# Patient Record
Sex: MALE | Race: WHITE | HISPANIC OR LATINO | Employment: UNEMPLOYED | URBAN - METROPOLITAN AREA
[De-identification: names, ages, dates, MRNs, and addresses within clinical notes are randomized per-mention and may not be internally consistent; named-entity substitution may affect disease eponyms.]

---

## 2022-05-13 ENCOUNTER — APPOINTMENT (OUTPATIENT)
Dept: RADIOLOGY | Facility: MEDICAL CENTER | Age: 67
DRG: 193 | End: 2022-05-13
Attending: EMERGENCY MEDICINE
Payer: MEDICARE

## 2022-05-13 ENCOUNTER — HOSPITAL ENCOUNTER (INPATIENT)
Facility: MEDICAL CENTER | Age: 67
LOS: 1 days | DRG: 193 | End: 2022-05-15
Attending: EMERGENCY MEDICINE | Admitting: STUDENT IN AN ORGANIZED HEALTH CARE EDUCATION/TRAINING PROGRAM
Payer: MEDICARE

## 2022-05-13 DIAGNOSIS — J10.1 INFLUENZA A: ICD-10-CM

## 2022-05-13 DIAGNOSIS — R79.89 ELEVATED TROPONIN: ICD-10-CM

## 2022-05-13 LAB
ALBUMIN SERPL BCP-MCNC: 4.4 G/DL (ref 3.2–4.9)
ALBUMIN/GLOB SERPL: 1.1 G/DL
ALP SERPL-CCNC: 124 U/L (ref 30–99)
ALT SERPL-CCNC: 16 U/L (ref 2–50)
ANION GAP SERPL CALC-SCNC: 13 MMOL/L (ref 7–16)
APPEARANCE UR: CLEAR
AST SERPL-CCNC: 24 U/L (ref 12–45)
BACTERIA #/AREA URNS HPF: NEGATIVE /HPF
BASOPHILS # BLD AUTO: 0.4 % (ref 0–1.8)
BASOPHILS # BLD: 0.03 K/UL (ref 0–0.12)
BILIRUB SERPL-MCNC: 0.7 MG/DL (ref 0.1–1.5)
BILIRUB UR QL STRIP.AUTO: NEGATIVE
BUN SERPL-MCNC: 17 MG/DL (ref 8–22)
CALCIUM SERPL-MCNC: 9.3 MG/DL (ref 8.5–10.5)
CHLORIDE SERPL-SCNC: 98 MMOL/L (ref 96–112)
CO2 SERPL-SCNC: 20 MMOL/L (ref 20–33)
COLOR UR: YELLOW
CREAT SERPL-MCNC: 1.06 MG/DL (ref 0.5–1.4)
EOSINOPHIL # BLD AUTO: 0.04 K/UL (ref 0–0.51)
EOSINOPHIL NFR BLD: 0.5 % (ref 0–6.9)
EPI CELLS #/AREA URNS HPF: NEGATIVE /HPF
ERYTHROCYTE [DISTWIDTH] IN BLOOD BY AUTOMATED COUNT: 43.8 FL (ref 35.9–50)
GFR SERPLBLD CREATININE-BSD FMLA CKD-EPI: 77 ML/MIN/1.73 M 2
GLOBULIN SER CALC-MCNC: 3.9 G/DL (ref 1.9–3.5)
GLUCOSE SERPL-MCNC: 133 MG/DL (ref 65–99)
GLUCOSE UR STRIP.AUTO-MCNC: NEGATIVE MG/DL
HCT VFR BLD AUTO: 44.1 % (ref 42–52)
HGB BLD-MCNC: 15.1 G/DL (ref 14–18)
HYALINE CASTS #/AREA URNS LPF: ABNORMAL /LPF
IMM GRANULOCYTES # BLD AUTO: 0.03 K/UL (ref 0–0.11)
IMM GRANULOCYTES NFR BLD AUTO: 0.4 % (ref 0–0.9)
KETONES UR STRIP.AUTO-MCNC: NEGATIVE MG/DL
LACTATE BLD-SCNC: 1.9 MMOL/L (ref 0.5–2)
LEUKOCYTE ESTERASE UR QL STRIP.AUTO: NEGATIVE
LIPASE SERPL-CCNC: 28 U/L (ref 11–82)
LYMPHOCYTES # BLD AUTO: 1.06 K/UL (ref 1–4.8)
LYMPHOCYTES NFR BLD: 13.8 % (ref 22–41)
MAGNESIUM SERPL-MCNC: 1.4 MG/DL (ref 1.5–2.5)
MCH RBC QN AUTO: 30.3 PG (ref 27–33)
MCHC RBC AUTO-ENTMCNC: 34.2 G/DL (ref 33.7–35.3)
MCV RBC AUTO: 88.6 FL (ref 81.4–97.8)
MICRO URNS: ABNORMAL
MONOCYTES # BLD AUTO: 0.83 K/UL (ref 0–0.85)
MONOCYTES NFR BLD AUTO: 10.8 % (ref 0–13.4)
NEUTROPHILS # BLD AUTO: 5.7 K/UL (ref 1.82–7.42)
NEUTROPHILS NFR BLD: 74.1 % (ref 44–72)
NITRITE UR QL STRIP.AUTO: NEGATIVE
NRBC # BLD AUTO: 0 K/UL
NRBC BLD-RTO: 0 /100 WBC
PH UR STRIP.AUTO: 5 [PH] (ref 5–8)
PHOSPHATE SERPL-MCNC: 3.2 MG/DL (ref 2.5–4.5)
PLATELET # BLD AUTO: 243 K/UL (ref 164–446)
PMV BLD AUTO: 10.2 FL (ref 9–12.9)
POTASSIUM SERPL-SCNC: 4.9 MMOL/L (ref 3.6–5.5)
PROT SERPL-MCNC: 8.3 G/DL (ref 6–8.2)
PROT UR QL STRIP: 30 MG/DL
RBC # BLD AUTO: 4.98 M/UL (ref 4.7–6.1)
RBC # URNS HPF: ABNORMAL /HPF
RBC UR QL AUTO: NEGATIVE
SODIUM SERPL-SCNC: 131 MMOL/L (ref 135–145)
SP GR UR STRIP.AUTO: 1.02
TROPONIN T SERPL-MCNC: 22 NG/L (ref 6–19)
UROBILINOGEN UR STRIP.AUTO-MCNC: 0.2 MG/DL
WBC # BLD AUTO: 7.7 K/UL (ref 4.8–10.8)
WBC #/AREA URNS HPF: ABNORMAL /HPF

## 2022-05-13 PROCEDURE — 96365 THER/PROPH/DIAG IV INF INIT: CPT

## 2022-05-13 PROCEDURE — 83930 ASSAY OF BLOOD OSMOLALITY: CPT

## 2022-05-13 PROCEDURE — 0241U HCHG SARS-COV-2 COVID-19 NFCT DS RESP RNA 4 TRGT MIC: CPT

## 2022-05-13 PROCEDURE — 99285 EMERGENCY DEPT VISIT HI MDM: CPT

## 2022-05-13 PROCEDURE — 84484 ASSAY OF TROPONIN QUANT: CPT

## 2022-05-13 PROCEDURE — 700111 HCHG RX REV CODE 636 W/ 250 OVERRIDE (IP): Performed by: EMERGENCY MEDICINE

## 2022-05-13 PROCEDURE — 80053 COMPREHEN METABOLIC PANEL: CPT

## 2022-05-13 PROCEDURE — 82010 KETONE BODYS QUAN: CPT

## 2022-05-13 PROCEDURE — 87086 URINE CULTURE/COLONY COUNT: CPT

## 2022-05-13 PROCEDURE — 36415 COLL VENOUS BLD VENIPUNCTURE: CPT

## 2022-05-13 PROCEDURE — 85025 COMPLETE CBC W/AUTO DIFF WBC: CPT

## 2022-05-13 PROCEDURE — 84100 ASSAY OF PHOSPHORUS: CPT

## 2022-05-13 PROCEDURE — C9803 HOPD COVID-19 SPEC COLLECT: HCPCS | Performed by: EMERGENCY MEDICINE

## 2022-05-13 PROCEDURE — 87040 BLOOD CULTURE FOR BACTERIA: CPT

## 2022-05-13 PROCEDURE — 83605 ASSAY OF LACTIC ACID: CPT

## 2022-05-13 PROCEDURE — A9270 NON-COVERED ITEM OR SERVICE: HCPCS | Performed by: EMERGENCY MEDICINE

## 2022-05-13 PROCEDURE — 700102 HCHG RX REV CODE 250 W/ 637 OVERRIDE(OP): Performed by: EMERGENCY MEDICINE

## 2022-05-13 PROCEDURE — 83036 HEMOGLOBIN GLYCOSYLATED A1C: CPT

## 2022-05-13 PROCEDURE — 71045 X-RAY EXAM CHEST 1 VIEW: CPT

## 2022-05-13 PROCEDURE — 83690 ASSAY OF LIPASE: CPT

## 2022-05-13 PROCEDURE — 700105 HCHG RX REV CODE 258: Performed by: EMERGENCY MEDICINE

## 2022-05-13 PROCEDURE — 83735 ASSAY OF MAGNESIUM: CPT

## 2022-05-13 PROCEDURE — 81001 URINALYSIS AUTO W/SCOPE: CPT

## 2022-05-13 RX ORDER — ACETAMINOPHEN 325 MG/1
650 TABLET ORAL ONCE
Status: DISCONTINUED | OUTPATIENT
Start: 2022-05-13 | End: 2022-05-13

## 2022-05-13 RX ORDER — ACETAMINOPHEN 325 MG/1
650 TABLET ORAL ONCE
Status: COMPLETED | OUTPATIENT
Start: 2022-05-13 | End: 2022-05-13

## 2022-05-13 RX ORDER — AZITHROMYCIN 500 MG/1
500 INJECTION, POWDER, LYOPHILIZED, FOR SOLUTION INTRAVENOUS ONCE
Status: COMPLETED | OUTPATIENT
Start: 2022-05-13 | End: 2022-05-14

## 2022-05-13 RX ADMIN — ACETAMINOPHEN 650 MG: 325 TABLET, FILM COATED ORAL at 22:50

## 2022-05-13 RX ADMIN — SODIUM CHLORIDE 3 G: 900 INJECTION INTRAVENOUS at 23:40

## 2022-05-14 PROBLEM — A41.9 SEPSIS WITH ACUTE HYPOXIC RESPIRATORY FAILURE WITHOUT SEPTIC SHOCK (HCC): Status: ACTIVE | Noted: 2022-05-14

## 2022-05-14 PROBLEM — J96.01 SEPSIS WITH ACUTE HYPOXIC RESPIRATORY FAILURE WITHOUT SEPTIC SHOCK (HCC): Status: ACTIVE | Noted: 2022-05-14

## 2022-05-14 PROBLEM — R65.20 SEPSIS WITH ACUTE HYPOXIC RESPIRATORY FAILURE WITHOUT SEPTIC SHOCK (HCC): Status: ACTIVE | Noted: 2022-05-14

## 2022-05-14 PROBLEM — J10.1 INFLUENZA A WITH RESPIRATORY MANIFESTATIONS: Status: ACTIVE | Noted: 2022-05-14

## 2022-05-14 PROBLEM — E83.42 HYPOMAGNESEMIA: Status: ACTIVE | Noted: 2022-05-14

## 2022-05-14 PROBLEM — E66.09 CLASS 1 OBESITY DUE TO EXCESS CALORIES WITHOUT SERIOUS COMORBIDITY WITH BODY MASS INDEX (BMI) OF 32.0 TO 32.9 IN ADULT: Status: ACTIVE | Noted: 2022-05-14

## 2022-05-14 PROBLEM — I10 HYPERTENSION: Status: ACTIVE | Noted: 2022-05-14

## 2022-05-14 PROBLEM — J96.01 ACUTE RESPIRATORY FAILURE WITH HYPOXIA (HCC): Status: ACTIVE | Noted: 2022-05-14

## 2022-05-14 PROBLEM — E11.9 TYPE 2 DIABETES MELLITUS WITHOUT COMPLICATION, WITHOUT LONG-TERM CURRENT USE OF INSULIN (HCC): Status: ACTIVE | Noted: 2022-05-14

## 2022-05-14 LAB
B-OH-BUTYR SERPL-MCNC: 0.16 MMOL/L (ref 0.02–0.27)
EST. AVERAGE GLUCOSE BLD GHB EST-MCNC: 134 MG/DL
FLUAV RNA SPEC QL NAA+PROBE: POSITIVE
FLUBV RNA SPEC QL NAA+PROBE: NEGATIVE
GLUCOSE BLD STRIP.AUTO-MCNC: 132 MG/DL (ref 65–99)
GLUCOSE BLD STRIP.AUTO-MCNC: 169 MG/DL (ref 65–99)
GLUCOSE BLD STRIP.AUTO-MCNC: 211 MG/DL (ref 65–99)
HBA1C MFR BLD: 6.3 % (ref 4–5.6)
OSMOLALITY SERPL: 282 MOSM/KG H2O (ref 278–298)
PROCALCITONIN SERPL-MCNC: <0.05 NG/ML
RSV RNA SPEC QL NAA+PROBE: NEGATIVE
SARS-COV-2 RNA RESP QL NAA+PROBE: NOTDETECTED
SPECIMEN SOURCE: ABNORMAL
TROPONIN T SERPL-MCNC: 24 NG/L (ref 6–19)

## 2022-05-14 PROCEDURE — 700111 HCHG RX REV CODE 636 W/ 250 OVERRIDE (IP): Performed by: EMERGENCY MEDICINE

## 2022-05-14 PROCEDURE — 700102 HCHG RX REV CODE 250 W/ 637 OVERRIDE(OP): Performed by: STUDENT IN AN ORGANIZED HEALTH CARE EDUCATION/TRAINING PROGRAM

## 2022-05-14 PROCEDURE — A9270 NON-COVERED ITEM OR SERVICE: HCPCS | Performed by: STUDENT IN AN ORGANIZED HEALTH CARE EDUCATION/TRAINING PROGRAM

## 2022-05-14 PROCEDURE — 700111 HCHG RX REV CODE 636 W/ 250 OVERRIDE (IP): Performed by: STUDENT IN AN ORGANIZED HEALTH CARE EDUCATION/TRAINING PROGRAM

## 2022-05-14 PROCEDURE — 99223 1ST HOSP IP/OBS HIGH 75: CPT | Mod: AI | Performed by: STUDENT IN AN ORGANIZED HEALTH CARE EDUCATION/TRAINING PROGRAM

## 2022-05-14 PROCEDURE — 700105 HCHG RX REV CODE 258: Performed by: STUDENT IN AN ORGANIZED HEALTH CARE EDUCATION/TRAINING PROGRAM

## 2022-05-14 PROCEDURE — 82962 GLUCOSE BLOOD TEST: CPT | Mod: 91

## 2022-05-14 PROCEDURE — 96366 THER/PROPH/DIAG IV INF ADDON: CPT

## 2022-05-14 PROCEDURE — 770001 HCHG ROOM/CARE - MED/SURG/GYN PRIV*

## 2022-05-14 PROCEDURE — 36415 COLL VENOUS BLD VENIPUNCTURE: CPT

## 2022-05-14 PROCEDURE — 700101 HCHG RX REV CODE 250: Performed by: STUDENT IN AN ORGANIZED HEALTH CARE EDUCATION/TRAINING PROGRAM

## 2022-05-14 PROCEDURE — 84484 ASSAY OF TROPONIN QUANT: CPT

## 2022-05-14 PROCEDURE — 84145 PROCALCITONIN (PCT): CPT

## 2022-05-14 PROCEDURE — 96372 THER/PROPH/DIAG INJ SC/IM: CPT

## 2022-05-14 PROCEDURE — 96367 TX/PROPH/DG ADDL SEQ IV INF: CPT

## 2022-05-14 RX ORDER — AMLODIPINE BESYLATE 10 MG/1
5 TABLET ORAL
Status: DISCONTINUED | OUTPATIENT
Start: 2022-05-14 | End: 2022-05-15 | Stop reason: HOSPADM

## 2022-05-14 RX ORDER — HYDRALAZINE HYDROCHLORIDE 20 MG/ML
10 INJECTION INTRAMUSCULAR; INTRAVENOUS EVERY 4 HOURS PRN
Status: DISCONTINUED | OUTPATIENT
Start: 2022-05-14 | End: 2022-05-15 | Stop reason: HOSPADM

## 2022-05-14 RX ORDER — GUAIFENESIN 600 MG/1
600 TABLET, EXTENDED RELEASE ORAL EVERY 12 HOURS
Status: DISCONTINUED | OUTPATIENT
Start: 2022-05-14 | End: 2022-05-15 | Stop reason: HOSPADM

## 2022-05-14 RX ORDER — POLYETHYLENE GLYCOL 3350 17 G/17G
1 POWDER, FOR SOLUTION ORAL
Status: DISCONTINUED | OUTPATIENT
Start: 2022-05-14 | End: 2022-05-15 | Stop reason: HOSPADM

## 2022-05-14 RX ORDER — BISACODYL 10 MG
10 SUPPOSITORY, RECTAL RECTAL
Status: DISCONTINUED | OUTPATIENT
Start: 2022-05-14 | End: 2022-05-15 | Stop reason: HOSPADM

## 2022-05-14 RX ORDER — SODIUM CHLORIDE 9 MG/ML
30 INJECTION, SOLUTION INTRAVENOUS ONCE
Status: COMPLETED | OUTPATIENT
Start: 2022-05-14 | End: 2022-05-14

## 2022-05-14 RX ORDER — AMOXICILLIN 250 MG
2 CAPSULE ORAL 2 TIMES DAILY
Status: DISCONTINUED | OUTPATIENT
Start: 2022-05-14 | End: 2022-05-15 | Stop reason: HOSPADM

## 2022-05-14 RX ORDER — DEXTROSE MONOHYDRATE 25 G/50ML
25 INJECTION, SOLUTION INTRAVENOUS
Status: DISCONTINUED | OUTPATIENT
Start: 2022-05-14 | End: 2022-05-15 | Stop reason: HOSPADM

## 2022-05-14 RX ORDER — AMLODIPINE BESYLATE 5 MG/1
5 TABLET ORAL 2 TIMES DAILY
COMMUNITY

## 2022-05-14 RX ORDER — IPRATROPIUM BROMIDE AND ALBUTEROL SULFATE 2.5; .5 MG/3ML; MG/3ML
3 SOLUTION RESPIRATORY (INHALATION)
Status: DISCONTINUED | OUTPATIENT
Start: 2022-05-14 | End: 2022-05-15 | Stop reason: HOSPADM

## 2022-05-14 RX ORDER — MAGNESIUM SULFATE HEPTAHYDRATE 40 MG/ML
4 INJECTION, SOLUTION INTRAVENOUS ONCE
Status: COMPLETED | OUTPATIENT
Start: 2022-05-14 | End: 2022-05-14

## 2022-05-14 RX ORDER — ACETAMINOPHEN 325 MG/1
650 TABLET ORAL EVERY 6 HOURS PRN
Status: DISCONTINUED | OUTPATIENT
Start: 2022-05-14 | End: 2022-05-15 | Stop reason: HOSPADM

## 2022-05-14 RX ORDER — OSELTAMIVIR PHOSPHATE 75 MG/1
75 CAPSULE ORAL 2 TIMES DAILY
Status: DISCONTINUED | OUTPATIENT
Start: 2022-05-14 | End: 2022-05-15 | Stop reason: HOSPADM

## 2022-05-14 RX ORDER — ENOXAPARIN SODIUM 100 MG/ML
40 INJECTION SUBCUTANEOUS DAILY
Status: DISCONTINUED | OUTPATIENT
Start: 2022-05-14 | End: 2022-05-15 | Stop reason: HOSPADM

## 2022-05-14 RX ADMIN — OSELTAMIVIR PHOSPHATE 75 MG: 75 CAPSULE ORAL at 02:38

## 2022-05-14 RX ADMIN — OSELTAMIVIR PHOSPHATE 75 MG: 75 CAPSULE ORAL at 21:05

## 2022-05-14 RX ADMIN — AZITHROMYCIN MONOHYDRATE 500 MG: 500 INJECTION, POWDER, LYOPHILIZED, FOR SOLUTION INTRAVENOUS at 00:35

## 2022-05-14 RX ADMIN — IPRATROPIUM BROMIDE AND ALBUTEROL SULFATE 3 ML: .5; 2.5 SOLUTION RESPIRATORY (INHALATION) at 18:59

## 2022-05-14 RX ADMIN — AMLODIPINE BESYLATE 5 MG: 10 TABLET ORAL at 12:27

## 2022-05-14 RX ADMIN — SODIUM CHLORIDE 1572 ML: 9 INJECTION, SOLUTION INTRAVENOUS at 02:42

## 2022-05-14 RX ADMIN — GUAIFENESIN 600 MG: 600 TABLET, EXTENDED RELEASE ORAL at 18:00

## 2022-05-14 RX ADMIN — INSULIN GLARGINE-YFGN 10 UNITS: 100 INJECTION, SOLUTION SUBCUTANEOUS at 13:37

## 2022-05-14 RX ADMIN — MAGNESIUM SULFATE HEPTAHYDRATE 4 G: 40 INJECTION, SOLUTION INTRAVENOUS at 02:08

## 2022-05-14 RX ADMIN — ENOXAPARIN SODIUM 40 MG: 40 INJECTION SUBCUTANEOUS at 05:17

## 2022-05-14 ASSESSMENT — COGNITIVE AND FUNCTIONAL STATUS - GENERAL
SUGGESTED CMS G CODE MODIFIER DAILY ACTIVITY: CH
CLIMB 3 TO 5 STEPS WITH RAILING: A LITTLE
MOBILITY SCORE: 20
MOVING FROM LYING ON BACK TO SITTING ON SIDE OF FLAT BED: A LITTLE
DAILY ACTIVITIY SCORE: 24
SUGGESTED CMS G CODE MODIFIER MOBILITY: CJ
WALKING IN HOSPITAL ROOM: A LITTLE
STANDING UP FROM CHAIR USING ARMS: A LITTLE

## 2022-05-14 ASSESSMENT — LIFESTYLE VARIABLES
TOTAL SCORE: 0
TOTAL SCORE: 0
DOES PATIENT WANT TO TALK TO SOMEONE ABOUT QUITTING: NO
EVER HAD A DRINK FIRST THING IN THE MORNING TO STEADY YOUR NERVES TO GET RID OF A HANGOVER: NO
HAVE YOU EVER FELT YOU SHOULD CUT DOWN ON YOUR DRINKING: NO
TOTAL SCORE: 0
HAVE PEOPLE ANNOYED YOU BY CRITICIZING YOUR DRINKING: NO
ALCOHOL_USE: NO
CONSUMPTION TOTAL: INCOMPLETE
EVER FELT BAD OR GUILTY ABOUT YOUR DRINKING: NO
DOES PATIENT WANT TO STOP DRINKING: YES

## 2022-05-14 ASSESSMENT — ENCOUNTER SYMPTOMS
SHORTNESS OF BREATH: 1
DIZZINESS: 0
ABDOMINAL PAIN: 0
DIARRHEA: 0
FEVER: 0
COUGH: 1
NAUSEA: 0
SORE THROAT: 0
HEADACHES: 0
CHILLS: 1
VOMITING: 0

## 2022-05-14 ASSESSMENT — PATIENT HEALTH QUESTIONNAIRE - PHQ9
2. FEELING DOWN, DEPRESSED, IRRITABLE, OR HOPELESS: NOT AT ALL
SUM OF ALL RESPONSES TO PHQ9 QUESTIONS 1 AND 2: 0
1. LITTLE INTEREST OR PLEASURE IN DOING THINGS: NOT AT ALL

## 2022-05-14 ASSESSMENT — PAIN DESCRIPTION - PAIN TYPE: TYPE: ACUTE PAIN

## 2022-05-14 NOTE — ASSESSMENT & PLAN NOTE
This is Sepsis Present on admission  SIRS criteria identified on my evaluation include: Fever, with temperature greater than 101 deg F, Tachycardia, with heart rate greater than 90 BPM and Tachypnea, with respirations greater than 20 per minute  Source is pulmonary   Sepsis protocol initiated  Fluid resuscitation ordered per protocol  Crystalloid Fluid Administration: Fluid resuscitation ordered per standard protocol - 30 mL/kg per current or ideal body weight  IV antibiotics as appropriate for source of sepsis  Reassessment: I have reassessed the patient's hemodynamic status    Influenza A positive, started on Tamiflu  He was given antibiotics in the ED prior to respiratory panel returning.  Procalcitonin pending

## 2022-05-14 NOTE — ED PROVIDER NOTES
"ED Provider Note    CHIEF COMPLAINT  Chief Complaint   Patient presents with   • Flu Like Symptoms     Pt reports high temp at home and dry throat when trying to talk. Pt has dry cough and \"haven't felt very good\" x 3 days       HPI  Aureliano Heredia is a 66 y.o. male who presents to the emergency department with three days of cough and fever. States that he feels like there is phlegm that he is unable to bring up. Now having slight chest wall discomfort with coughing episodes only. Denies any abdominal pain. No nausea vomiting or diarrhea. Traveled to [place] at the end of January into February. Has since been back in the [place]. Has had his COVID vaccines series initially and also seasonal influenza vaccine. States that he is scheduled for his next COVID booster next month.    He has noticed that his blood sugars have spent training slightly on the higher side. This morning was 150. Has been compliant with his diabetic and antihypertensive medication.    REVIEW OF SYSTEMS  See HPI for further details. All other systems are negative.     PAST MEDICAL HISTORY   has a past medical history of Diabetes (HCC) and Hypertension.    SOCIAL HISTORY  Social History     Tobacco Use   • Smoking status: Never Smoker   • Smokeless tobacco: Never Used   Substance and Sexual Activity   • Alcohol use: Never   • Drug use: Never   • Sexual activity: Not on file       SURGICAL HISTORY  patient denies any surgical history    CURRENT MEDICATIONS  Home Medications     Reviewed by Ro Holbrook R.N. (Registered Nurse) on 05/13/22 at 2141  Med List Status: Not Addressed   Medication Last Dose Status        Patient Dillon Taking any Medications                       ALLERGIES  No Known Allergies    PHYSICAL EXAM  VITAL SIGNS: /71   Pulse 96   Temp (!) 38.4 °C (101.1 °F) (Temporal)   Resp 16   Ht 1.55 m (5' 1.02\")   Wt 79.2 kg (174 lb 9.7 oz)   SpO2 96%   BMI 32.97 kg/m²  @SILVINO[[id number]::@   Pulse ox interpretation: I interpret " this pulse ox as normal.  Constitutional: Alert in no apparent distress.  HENT: No signs of trauma, Bilateral external ears normal, Nose normal.   Eyes: Pupils are equal and reactive  Neck: Normal range of motion, No tenderness, Supple  Cardiovascular: tachy, Regular rate and rhythm, no murmurs.   Thorax & Lungs: Normal breath sounds, No respiratory distress, No wheezing, No chest tenderness.   Abdomen: Bowel sounds normal, Soft  Skin: Warm, Dry, No erythema, No rash.   Extremities: Intact distal pulses, No edema  Musculoskeletal: Good range of motion in all major joints. No tenderness to palpation or major deformities noted.   Neurologic: Alert , Normal motor function, Normal sensory function, No focal deficits noted.   Psychiatric: Affect normal, Judgment normal, Mood normal.       DIAGNOSTIC STUDIES / PROCEDURES      LABS  Results for orders placed or performed during the hospital encounter of 05/13/22   CBC With Differential   Result Value Ref Range    WBC 7.7 4.8 - 10.8 K/uL    RBC 4.98 4.70 - 6.10 M/uL    Hemoglobin 15.1 14.0 - 18.0 g/dL    Hematocrit 44.1 42.0 - 52.0 %    MCV 88.6 81.4 - 97.8 fL    MCH 30.3 27.0 - 33.0 pg    MCHC 34.2 33.7 - 35.3 g/dL    RDW 43.8 35.9 - 50.0 fL    Platelet Count 243 164 - 446 K/uL    MPV 10.2 9.0 - 12.9 fL    Neutrophils-Polys 74.10 (H) 44.00 - 72.00 %    Lymphocytes 13.80 (L) 22.00 - 41.00 %    Monocytes 10.80 0.00 - 13.40 %    Eosinophils 0.50 0.00 - 6.90 %    Basophils 0.40 0.00 - 1.80 %    Immature Granulocytes 0.40 0.00 - 0.90 %    Nucleated RBC 0.00 /100 WBC    Neutrophils (Absolute) 5.70 1.82 - 7.42 K/uL    Lymphs (Absolute) 1.06 1.00 - 4.80 K/uL    Monos (Absolute) 0.83 0.00 - 0.85 K/uL    Eos (Absolute) 0.04 0.00 - 0.51 K/uL    Baso (Absolute) 0.03 0.00 - 0.12 K/uL    Immature Granulocytes (abs) 0.03 0.00 - 0.11 K/uL    NRBC (Absolute) 0.00 K/uL   Comp Metabolic Panel   Result Value Ref Range    Sodium 131 (L) 135 - 145 mmol/L    Potassium 4.9 3.6 - 5.5 mmol/L     Chloride 98 96 - 112 mmol/L    Co2 20 20 - 33 mmol/L    Anion Gap 13.0 7.0 - 16.0    Glucose 133 (H) 65 - 99 mg/dL    Bun 17 8 - 22 mg/dL    Creatinine 1.06 0.50 - 1.40 mg/dL    Calcium 9.3 8.5 - 10.5 mg/dL    AST(SGOT) 24 12 - 45 U/L    ALT(SGPT) 16 2 - 50 U/L    Alkaline Phosphatase 124 (H) 30 - 99 U/L    Total Bilirubin 0.7 0.1 - 1.5 mg/dL    Albumin 4.4 3.2 - 4.9 g/dL    Total Protein 8.3 (H) 6.0 - 8.2 g/dL    Globulin 3.9 (H) 1.9 - 3.5 g/dL    A-G Ratio 1.1 g/dL   Lipase   Result Value Ref Range    Lipase 28 11 - 82 U/L   Lactic Acid   Result Value Ref Range    Lactic Acid 1.9 0.5 - 2.0 mmol/L   Urinalysis    Specimen: Urine   Result Value Ref Range    Color Yellow     Character Clear     Specific Gravity 1.017 <1.035    Ph 5.0 5.0 - 8.0    Glucose Negative Negative mg/dL    Ketones Negative Negative mg/dL    Protein 30 (A) Negative mg/dL    Bilirubin Negative Negative    Urobilinogen, Urine 0.2 Negative    Nitrite Negative Negative    Leukocyte Esterase Negative Negative    Occult Blood Negative Negative    Micro Urine Req Microscopic    Magnesium   Result Value Ref Range    Magnesium 1.4 (L) 1.5 - 2.5 mg/dL   Phosphorus   Result Value Ref Range    Phosphorus 3.2 2.5 - 4.5 mg/dL   Troponin   Result Value Ref Range    Troponin T 22 (H) 6 - 19 ng/L   BETA-HYDROXYBUTYRIC ACID   Result Value Ref Range    beta-Hydroxybutyric Acid 0.16 0.02 - 0.27 mmol/L   OSMOLALITY SERUM   Result Value Ref Range    Osmolality Serum 282 278 - 298 mOsm/kg H2O   COV-2, FLU A/B, AND RSV BY PCR (2-4 HOURS CEPHEID): Collect NP swab in VTM    Specimen: Nasopharyngeal; Respirate   Result Value Ref Range    Influenza virus A RNA POSITIVE (A) Negative    Influenza virus B, PCR Negative Negative    RSV, PCR Negative Negative    SARS-CoV-2 by PCR NotDetected     SARS-CoV-2 Source NP Swab    ESTIMATED GFR   Result Value Ref Range    GFR (CKD-EPI) 77 >60 mL/min/1.73 m 2   URINE MICROSCOPIC (W/UA)   Result Value Ref Range    WBC 0-2 (A) /hpf     RBC 0-2 (A) /hpf    Bacteria Negative None /hpf    Epithelial Cells Negative /hpf    Hyaline Cast 0-2 /lpf   TROPONIN   Result Value Ref Range    Troponin T 24 (H) 6 - 19 ng/L   HEMOGLOBIN A1C   Result Value Ref Range    Glycohemoglobin 6.3 (H) 4.0 - 5.6 %    Est Avg Glucose 134 mg/dL         RADIOLOGY  DX-CHEST-PORTABLE (1 VIEW)   Final Result      Low lung volumes with perihilar and basilar hypoventilatory changes. No acute cardiopulmonary abnormality.              COURSE & MEDICAL DECISION MAKING  Pertinent Labs & Imaging studies reviewed. (See chart for details)    66-year-old male presented to the emergency department with the above presentation. Influenza A positive which I believe is the primary causation of his current clinical presentation. He does however have a mild elevated troponin. This is most likely secondary to current inflammatory process but this could theoretically also represent a mild myocarditis. EKG as above. I will the patient brought into the hospital for ongoing inpatient care and workup.        FINAL IMPRESSION  1. Influenza A    2. Elevated troponin            Electronically signed by: Sheldon Pena M.D., 5/13/2022 10:49 PM

## 2022-05-14 NOTE — PROGRESS NOTES
0700- 1930    VSS on room air. Denies pain. Tolerating diet without nausea. Adequately voiding with bedside urinal. Up SBA with personal cane. Droplet and contact precautions maintained. Pt sounding wheezy toward end of shift, RT messaged for PRN DUONEB.  utilized. Safety maintained.

## 2022-05-14 NOTE — H&P
"Hospital Medicine History & Physical Note    Date of Service  5/14/2022    Primary Care Physician  No primary care provider on file.    Code Status  Full Code    Chief Complaint  Chief Complaint   Patient presents with   • Flu Like Symptoms     Pt reports high temp at home and dry throat when trying to talk. Pt has dry cough and \"haven't felt very good\" x 3 days       History of Presenting Illness  Aureliano Heredia is a 66 y.o. male who presented 5/13/2022 with flulike symptoms.  PMH of DM2.  For the past 3 days complaining of cough, chills, generalized aches, weakness.  Denies chest pain, did not measure fever at home, no abdominal or urinary symptoms.  No sick contacts, in the ED said he was vaccinated for flu but he told me he was not.    In the ED febrile at 101.1, tachycardic, tachypneic, saturating in the 80s on room air requiring 2 L O2.  Labs show flu positive.    I discussed the plan of care with patient, family, bedside RN and edp.    Review of Systems  Review of Systems   Constitutional: Positive for chills and malaise/fatigue. Negative for fever.   HENT: Negative for sore throat.    Respiratory: Positive for cough and shortness of breath.    Cardiovascular: Negative for chest pain.   Gastrointestinal: Negative for abdominal pain, diarrhea, nausea and vomiting.   Genitourinary: Negative for dysuria and urgency.   Neurological: Negative for dizziness and headaches.   All other systems reviewed and are negative.      Past Medical History   has a past medical history of Diabetes (HCC) and Hypertension.    Surgical History   has no past surgical history on file.     Family History  family history is not on file.   Family history reviewed with patient. There is no family history that is pertinent to the chief complaint.     Social History   reports that he has never smoked. He has never used smokeless tobacco. He reports that he does not drink alcohol and does not use drugs.    Allergies  No Known " Allergies    Medications  None       Physical Exam  Temp:  [38.4 °C (101.1 °F)] 38.4 °C (101.1 °F)  Pulse:  [] 96  Resp:  [16-35] 16  BP: (115-152)/(69-91) 117/71  SpO2:  [89 %-96 %] 96 %  Blood Pressure : 117/71   Temperature: (!) 38.4 °C (101.1 °F)   Pulse: 96   Respiration: 16   Pulse Oximetry: 96 %       Physical Exam  Constitutional:       Appearance: He is obese.   HENT:      Head: Normocephalic and atraumatic.      Mouth/Throat:      Mouth: Mucous membranes are dry.      Pharynx: Oropharynx is clear. No oropharyngeal exudate or posterior oropharyngeal erythema.   Eyes:      General: No scleral icterus.  Cardiovascular:      Rate and Rhythm: Normal rate and regular rhythm.      Pulses: Normal pulses.      Heart sounds: Normal heart sounds. No murmur heard.  Pulmonary:      Effort: Pulmonary effort is normal. No respiratory distress.      Breath sounds: Normal breath sounds. No wheezing.   Abdominal:      Palpations: Abdomen is soft.      Tenderness: There is no abdominal tenderness.   Musculoskeletal:         General: No swelling or tenderness. Normal range of motion.      Cervical back: Normal range of motion.   Skin:     General: Skin is warm and dry.   Neurological:      General: No focal deficit present.      Mental Status: He is alert and oriented to person, place, and time. Mental status is at baseline.   Psychiatric:         Mood and Affect: Mood normal.         Laboratory:  Recent Labs     05/13/22 2227   WBC 7.7   RBC 4.98   HEMOGLOBIN 15.1   HEMATOCRIT 44.1   MCV 88.6   MCH 30.3   MCHC 34.2   RDW 43.8   PLATELETCT 243   MPV 10.2     Recent Labs     05/13/22 2227   SODIUM 131*   POTASSIUM 4.9   CHLORIDE 98   CO2 20   GLUCOSE 133*   BUN 17   CREATININE 1.06   CALCIUM 9.3     Recent Labs     05/13/22 2227   ALTSGPT 16   ASTSGOT 24   ALKPHOSPHAT 124*   TBILIRUBIN 0.7   LIPASE 28   GLUCOSE 133*         No results for input(s): NTPROBNP in the last 72 hours.      Recent Labs     05/13/22 2227  05/14/22  0033   TROPONINT 22* 24*       Imaging:  DX-CHEST-PORTABLE (1 VIEW)   Final Result      Low lung volumes with perihilar and basilar hypoventilatory changes. No acute cardiopulmonary abnormality.          X-Ray:  I have personally reviewed the images and compared with prior images. and My impression is: no acute abnormalities     Assessment/Plan:  Justification for Admission Status  I anticipate this patient will require at least two midnights for appropriate medical management, necessitating inpatient admission because Patient flu positive and 3/4 SIRS criteria including fever, tachycardia, tachypnea.  He was saturating in the 80s on room air requiring oxygen per nasal cannula.  Also has electrolyte abnormalities due to decreased intake due to acute illness.  Required admission and treatment and monitoring for improvement in vitals, oxygen, labs.  This would require more than 2 midnight stay.    * Influenza A with respiratory manifestations- (present on admission)  Assessment & Plan  Positive for influenza A, has cough and respiratory symptoms requiring oxygen  Symptoms for 3 days, since she will be hospitalized due to sepsis and hypoxia will start on Tamiflu    Acute respiratory failure with hypoxia (HCC)- (present on admission)  Assessment & Plan  Saturating in the 80s on room air requiring 1-2 L O2 on my evaluation  Has acute flu infection with respiratory symptoms likely the cause  In addition patient obese, may have component of obesity hypoventilation syndrome and ALL which would require outpatient follow-up  Wean off oxygen as tolerated, incentive spirometer    Sepsis with acute hypoxic respiratory failure without septic shock (HCC)- (present on admission)  Assessment & Plan  This is Sepsis Present on admission  SIRS criteria identified on my evaluation include: Fever, with temperature greater than 101 deg F, Tachycardia, with heart rate greater than 90 BPM and Tachypnea, with respirations greater  than 20 per minute  Source is pulmonary   Sepsis protocol initiated  Fluid resuscitation ordered per protocol  Crystalloid Fluid Administration: Fluid resuscitation ordered per standard protocol - 30 mL/kg per current or ideal body weight  IV antibiotics as appropriate for source of sepsis  Reassessment: I have reassessed the patient's hemodynamic status    Influenza A positive, started on Tamiflu  He was given antibiotics in the ED prior to respiratory panel returning.  Procalcitonin pending    Hypomagnesemia- (present on admission)  Assessment & Plan  Likely due to decreased p.o. intake due to acute illness, replete as needed    Type 2 diabetes mellitus without complication, without long-term current use of insulin (HCC)- (present on admission)  Assessment & Plan  Not on any medication.  Slightly hyperglycemic, check A1c  Start sliding scale if needed    Class 1 obesity due to excess calories without serious comorbidity with body mass index (BMI) of 32.0 to 32.9 in adult- (present on admission)  Assessment & Plan  BMI 32.97      VTE prophylaxis: enoxaparin ppx

## 2022-05-14 NOTE — ED TRIAGE NOTES
"Chief Complaint   Patient presents with   • Flu Like Symptoms     Pt reports high temp at home and dry throat when trying to talk. Pt has dry cough and \"haven't felt very good\" x 3 days       Pt to triage with cane and steady gait for above complaint.     Pt presents in triage with reports of not feeling well x3 days. Pt reports high temp at home and dry cough with difficulties speaking due to dryness in throat.    Pt back to lobby, educated on triage process and encourage to alert staff of any changes.     BP (!) 152/90   Pulse (!) 127   Temp (!) 38.4 °C (101.1 °F) (Temporal)   Resp 16   Ht 1.55 m (5' 1.02\")   Wt 79.2 kg (174 lb 9.7 oz)   SpO2 92%   BMI 32.97 kg/m²     "

## 2022-05-14 NOTE — ASSESSMENT & PLAN NOTE
Saturating in the 80s on room air requiring 1-2 L O2 on my evaluation  Has acute flu infection with respiratory symptoms likely the cause  In addition patient obese, may have component of obesity hypoventilation syndrome and ALL which would require outpatient follow-up  Wean off oxygen as tolerated, incentive spirometer

## 2022-05-14 NOTE — PROGRESS NOTES
Patient was seen and examined at bedside.  Patient is Serbian spoken only.   was used during the interview.  Care plan was discussed with the patient and the wife at bedside.     Patient feels better, reported a cough with thick sputum.  Ordered Mucinex, DuoNeb.  Denies smoke or history of COPD.   Reported history of diabetes a.m. insulin 28 units and p.m.  insulin 15 units.   at admission.  Ordered Lantus 10 units, plus SSI, continue to monitor    History of hypertension, resume home amlodipine    Currently on 2 L oxygen, continue to wean down oxygen.  plan for ambulation test later today.  May DC home tomorrow.

## 2022-05-14 NOTE — ASSESSMENT & PLAN NOTE
Positive for influenza A, has cough and respiratory symptoms requiring oxygen  Symptoms for 3 days, since she will be hospitalized due to sepsis and hypoxia will start on Tamiflu

## 2022-05-14 NOTE — ED NOTES
Assumed report and patient care from Mellisa RN. Patient is resting comfortably in bed with no signs of labored breathing or restlessness. POC discussed. No questions or concerns at this time. Whiteboard updated. Pending azithromycin admin. Safety measures in place, call light within reach.

## 2022-05-14 NOTE — ASSESSMENT & PLAN NOTE
Reported a.m. insulin 28 units and p.m.  insulin 15 units.     at admission.  a1c 6.3  Ordered Lantus 10 units, SSI,   continue to monitor

## 2022-05-14 NOTE — ED NOTES
Report given to Virgilio MORGAN. Safety measures in place. Call light within reach. Care relinquished.

## 2022-05-15 ENCOUNTER — PHARMACY VISIT (OUTPATIENT)
Dept: PHARMACY | Facility: MEDICAL CENTER | Age: 67
End: 2022-05-15
Payer: COMMERCIAL

## 2022-05-15 VITALS
WEIGHT: 174.6 LBS | OXYGEN SATURATION: 92 % | HEIGHT: 61 IN | SYSTOLIC BLOOD PRESSURE: 138 MMHG | RESPIRATION RATE: 16 BRPM | HEART RATE: 88 BPM | BODY MASS INDEX: 32.97 KG/M2 | TEMPERATURE: 98 F | DIASTOLIC BLOOD PRESSURE: 73 MMHG

## 2022-05-15 LAB
ANION GAP SERPL CALC-SCNC: 11 MMOL/L (ref 7–16)
BUN SERPL-MCNC: 15 MG/DL (ref 8–22)
CALCIUM SERPL-MCNC: 8.7 MG/DL (ref 8.5–10.5)
CHLORIDE SERPL-SCNC: 98 MMOL/L (ref 96–112)
CO2 SERPL-SCNC: 23 MMOL/L (ref 20–33)
CREAT SERPL-MCNC: 0.96 MG/DL (ref 0.5–1.4)
ERYTHROCYTE [DISTWIDTH] IN BLOOD BY AUTOMATED COUNT: 43.4 FL (ref 35.9–50)
GFR SERPLBLD CREATININE-BSD FMLA CKD-EPI: 87 ML/MIN/1.73 M 2
GLUCOSE BLD STRIP.AUTO-MCNC: 123 MG/DL (ref 65–99)
GLUCOSE BLD STRIP.AUTO-MCNC: 133 MG/DL (ref 65–99)
GLUCOSE BLD STRIP.AUTO-MCNC: 140 MG/DL (ref 65–99)
GLUCOSE SERPL-MCNC: 121 MG/DL (ref 65–99)
HCT VFR BLD AUTO: 39.3 % (ref 42–52)
HGB BLD-MCNC: 13.2 G/DL (ref 14–18)
MAGNESIUM SERPL-MCNC: 1.7 MG/DL (ref 1.5–2.5)
MCH RBC QN AUTO: 29.7 PG (ref 27–33)
MCHC RBC AUTO-ENTMCNC: 33.6 G/DL (ref 33.7–35.3)
MCV RBC AUTO: 88.3 FL (ref 81.4–97.8)
PLATELET # BLD AUTO: 223 K/UL (ref 164–446)
PMV BLD AUTO: 9.5 FL (ref 9–12.9)
POTASSIUM SERPL-SCNC: 4.6 MMOL/L (ref 3.6–5.5)
RBC # BLD AUTO: 4.45 M/UL (ref 4.7–6.1)
SODIUM SERPL-SCNC: 132 MMOL/L (ref 135–145)
WBC # BLD AUTO: 4.7 K/UL (ref 4.8–10.8)

## 2022-05-15 PROCEDURE — 80048 BASIC METABOLIC PNL TOTAL CA: CPT

## 2022-05-15 PROCEDURE — 83735 ASSAY OF MAGNESIUM: CPT

## 2022-05-15 PROCEDURE — 700102 HCHG RX REV CODE 250 W/ 637 OVERRIDE(OP): Performed by: STUDENT IN AN ORGANIZED HEALTH CARE EDUCATION/TRAINING PROGRAM

## 2022-05-15 PROCEDURE — 85027 COMPLETE CBC AUTOMATED: CPT

## 2022-05-15 PROCEDURE — 82962 GLUCOSE BLOOD TEST: CPT

## 2022-05-15 PROCEDURE — 99239 HOSP IP/OBS DSCHRG MGMT >30: CPT | Performed by: FAMILY MEDICINE

## 2022-05-15 PROCEDURE — A9270 NON-COVERED ITEM OR SERVICE: HCPCS | Performed by: STUDENT IN AN ORGANIZED HEALTH CARE EDUCATION/TRAINING PROGRAM

## 2022-05-15 PROCEDURE — 700111 HCHG RX REV CODE 636 W/ 250 OVERRIDE (IP): Performed by: STUDENT IN AN ORGANIZED HEALTH CARE EDUCATION/TRAINING PROGRAM

## 2022-05-15 PROCEDURE — 36415 COLL VENOUS BLD VENIPUNCTURE: CPT

## 2022-05-15 RX ORDER — OSELTAMIVIR PHOSPHATE 75 MG/1
75 CAPSULE ORAL 2 TIMES DAILY
Qty: 7 CAPSULE | Refills: 0 | Status: SHIPPED | OUTPATIENT
Start: 2022-05-15 | End: 2022-05-21

## 2022-05-15 RX ADMIN — GUAIFENESIN 600 MG: 600 TABLET, EXTENDED RELEASE ORAL at 06:51

## 2022-05-15 RX ADMIN — AMLODIPINE BESYLATE 5 MG: 10 TABLET ORAL at 06:50

## 2022-05-15 RX ADMIN — SENNOSIDES AND DOCUSATE SODIUM 2 TABLET: 50; 8.6 TABLET ORAL at 06:51

## 2022-05-15 RX ADMIN — OSELTAMIVIR PHOSPHATE 75 MG: 75 CAPSULE ORAL at 07:07

## 2022-05-15 RX ADMIN — INSULIN GLARGINE-YFGN 10 UNITS: 100 INJECTION, SOLUTION SUBCUTANEOUS at 07:07

## 2022-05-15 RX ADMIN — ENOXAPARIN SODIUM 40 MG: 40 INJECTION SUBCUTANEOUS at 06:52

## 2022-05-15 NOTE — DISCHARGE INSTRUCTIONS
"Influenza A (H1N1)  (Influenza A [H1N1])  La gripe H1N1, llamada \"gripe porcina\" es producida por un nuevo virus de influenza. El virus H1N1 es diferente del virus de la influenza estacional. Las últimas pruebas muestran que el virus H1N1 tiene genes similares a los del virus de la gripe que afecta a los cerdos en [place] y [place]. Los síntomas son similares a los de la gripe estacional y se disemina de persona a persona. Usted puede tener riesgo de sufrir mayores complicaciones si tiene ella enfermedad crónica subyacente. El CDC y la [employer] [place] están controlando los casos informados en todo el geoff.  CAUSAS  La gripe se contagia a través de las gotitas que se eliminan con la tos y los estornudos.  Ella persona puede infectarse tocando algo que contenga el virus y luego tocándose la boca o la nariz.  SÍNTOMAS  Fiebre.  Dolor de irma.  Cansancio.  Tos.  Dolor de garganta.  Congestión nasal o que gotea.  Jannette en el cuerpo.  También pueden existir diarrea y vómitos.  Estos síntomas se conocen alan \"síntomas característicos de la gripe\". Muchas enfermedades distintas, incluso el resfrío común, pueden tener síntomas similares.  DIAGNÓSTICO  Existen análisis que permiten diagnosticar si usted tiene el virus H1N1.  Todos los casos confirmados serán informados al departamento de eliseo estatal o local.  Puede ser necesario que huff médico le realice pruebas para determinar si tiene otra infección que pueda ser ella complicación de la gripe.  INSTRUCCIONES PARA EL CUIDADO DOMICILIARIO  Busque ayuda de inmediato si desarrolla alguno de los siguientes síntomas.  Si está en riesgo de sufrir complicaciones de la gripe, deberá consultar a un profesional de la eliseo cuando comiencen los síntomas. Las personas que tienen un alto riesgo de complicaciones son:  Mayores de 65 años de edad.  Pacientes con enfermedades crónicas.  Mujeres embarazadas.  Niños pequeños.  El profesional que lo asiste podrá recomendar la " utilización de ciertas drogas antivirales para el tratamiento de la gripe.  Si contrae gripe, es aconsejable que descanse lo suficiente, fatou gran cantidad de líquidos y evite el consumo de alcohol y tabaco.  También puede radha medicamentos de venta norbert que alivien los síntomas de la gripe, si se lo permite el profesional que lo asiste. (Nunca de aspirina a niños o adolescentes que tienen síntomas de la gripe, particularmente fiebre).  TRATAMIENTO  Si contrae la enfermedad, hay drogas antivirales disponibles. Estos medicamentos pueden hacer que huff enfermedad sea más leve y a que mejore más rápidamente. El tratamiento debe comenzar poco después del inicio de la enfermedad. Sólo el médico puede prescribir medicamentos antivirales.  PREVENCIÓN  Cúbrase la boca y la nariz con un tisú o con la mano cuando tosa o estornude. Descarte el tisú.  Lave kalli mariah frecuentemente con agua y jabón. Utilice un limpiador a base de alcohol si no dispone de agua.  Evite tocarse los ojos, la nariz o la boca.  Trate de evitar el contacto con personas enfermas.  Si está enfermo, quédese en huff casa y no concurra al trabajo o a la escuela para evitar el contagio a otras personas. Las personas infectadas con el virus H1N1 pueden infectar a otras desde un día antes de tener los síntomas hasta 5 a 7 días después.  Hay disponible ella vacuna que ayuda a la protección contra el virus H1N1. Además de esta vacuna, deberá vacunarse contra la gripe estacional. Estas dos vacunas deben aplicarse el mismo día. El CDC recomienda especialmente la vacuna contra el virus H1N1 en:  Mujeres embarazadas.  Personas que viven con niños menores de 6 meses o cuidan de ellos.  Personal de los servicios de eliseo y emergencias.  Personas entre los 6 meses y los 24 años de edad.  Personas entre 25 y 64 años que tienen más riesgo si contraen el virus H1N1 por que padecen enfermedades crónicas o porque tienen compromiso de huff sistema inmunológico.  GARRET:  En  comunidades y hogares no se recomienda el uso de mascarillas ni barbijos N95. En ciertas circunstancias, pueden utilizarlos las personas que tienen más riesgo de desarrollar levi graves de influenza. Huff médico le podrá ras otras tras recomendaciones para el uso de las mascarillas.  SIGNOS DE ALERTA POR TRATARSE DE ELLA EMERGENCIA QUE NECESITAN ATENCIÓN MÉDICA DE URGENCIA TANTO EN NIÑOS :  Respiración rápida o problemas para respirar.  Color de piel azulado.  No nilson suficiente cantidad de líquidos.  No se despierta o no interactúa.  Está tan irritable que no quiere que se lo cargue.  Huff michelle tienen ella temperatura oral de más de 102° F (38.9° C) y no puede controlarla con medicamentos.  Huff bebé tiene más de 3 meses y huff temperatura rectal es de 102° F (38.9° C) o más.  Huff bebé tiene 3 meses o menos y huff temperatura rectal es de 100.4° F (38° C) o más.  Fiebre con erupción cutánea.  SIGNOS DE ALERTA POR TRATARSE DE ELLA EMERGENCIA QUE NECESITAN ATENCIÓN MÉDICA DE URGENCIA TANTO EN ADULTOS:  Le falta la respiración o presenta dificultades respiratorias.  Dolor o presión en el pecho o abdomen.  Mareos repentinos.  Confusión.  Vómitos intensos y persistentes.  Está tan irritable que no quiere que se lo cargue.  Usted ella temperatura oral de más de 102° F (38.9° C) y no puede controlarla con medicamentos.  Los síntomas mejoran neno luego vuelven con fiebre y la tos empeora.  SOLICITE ATENCIÓN MÉDICA DE INMEDIATO SI OBSERVA:   Usted o alguien que conoce tiene los síntomas descritos arriba. Cuando llegue al centro de emergencias, comunique en la recepción que shmuel tener gripe. Es posible que le pidan que utilice ella máscara y/o ubicarse en un área aislada para evitar que otros se contagien.  ESTÉ SEGURO QUE:   Comprende las instrucciones para el earl médica.  Controlará huff enfermedad.  Solicitará atención médica de inmediato según las indicaciones.  Parte de esta información es cortesía de los CDC.  Document Released:  06/05/2009 Document Revised: 03/11/2013  ExitCare® Patient Information ©2014 [employer].      Discharge Instructions    Discharged to home by car with relative. Discharged via wheelchair, hospital escort: Yes.  Special equipment needed: Not Applicable    Be sure to schedule a follow-up appointment with your primary care doctor or any specialists as instructed.     Discharge Plan:        I understand that a diet low in cholesterol, fat, and sodium is recommended for good health. Unless I have been given specific instructions below for another diet, I accept this instruction as my diet prescription.   Other diet: As tolerated    Special Instructions: None    Is patient discharged on Warfarin / Coumadin?   No     Depression / Suicide Risk    As you are discharged from this [facility] facility, it is important to learn how to keep safe from harming yourself.    Recognize the warning signs:  Abrupt changes in personality, positive or negative- including increase in energy   Giving away possessions  Change in eating patterns- significant weight changes-  positive or negative  Change in sleeping patterns- unable to sleep or sleeping all the time   Unwillingness or inability to communicate  Depression  Unusual sadness, discouragement and loneliness  Talk of wanting to die  Neglect of personal appearance   Rebelliousness- reckless behavior  Withdrawal from people/activities they love  Confusion- inability to concentrate     If you or a loved one observes any of these behaviors or has concerns about self-harm, here's what you can do:  Talk about it- your feelings and reasons for harming yourself  Remove any means that you might use to hurt yourself (examples: pills, rope, extension cords, firearm)  Get professional help from the community (Mental Health, Substance Abuse, psychological counseling)  Do not be alone:Call your Safe Contact- someone whom you trust who will be there for you.  Call your local CRISIS HOTLINE  473-1086 or [phone]  Call your local Children's Mobile Crisis Response Team [place] (686) 338-3309 or [email or web address]  Call the toll free National Suicide Prevention Hotlines   National Suicide Prevention Lifeline 123-111-AOSG (3218)  National [employer] Network 800-SUICIDE (156-9236)

## 2022-05-15 NOTE — PROGRESS NOTES
Discharging Patient home per physician order.  Discharged with wife.  Demonstrated understanding of discharge instructions, follow up appointments, home medications, prescriptions, home care for surgical wound, and nursing care instructions for Influenza A.  Ambulating without assistance, voiding without difficulty, pain well controlled, tolerating oral medications, oxygen saturation greater than 90% , tolerating diet. Educational handouts given and discussed.  Verbalized understanding of discharge instructions and educational handouts.  Patient able to state several reasons why to return to ED or seek medical attention. All questions answered.  Belongings and dressing supplies with patient at time of discharge.

## 2022-05-15 NOTE — CARE PLAN
The patient is Stable - Low risk of patient condition declining or worsening    Shift Goals  Clinical Goals: discharge  Patient Goals: discharge, rest    Progress made toward(s) clinical / shift goals:  Discharge      Problem: Knowledge Deficit - Standard  Goal: Patient and family/care givers will demonstrate understanding of plan of care, disease process/condition, diagnostic tests and medications  Outcome: Progressing

## 2022-05-15 NOTE — PROGRESS NOTES
Received report from previous shift RN  Assessment complete.  A&O x4. Patient calls appropriately. Patient primarily Bel[place]ian speaking.  Patient ambulates with standby assist.  Patient has 0/10 pain. Declines pain intervention.  Denies N&V. Tolerating regular diet.  + void, +flatus, BM PTA.  Patient denies SOB. Spo2>95% on room air.  Reviewed plan of care with patient. Call light and personal belongings with in reach. Hourly rounding in place. All needs met at this time.

## 2022-05-15 NOTE — DISCHARGE SUMMARY
"Discharge Summary    CHIEF COMPLAINT ON ADMISSION  Chief Complaint   Patient presents with   • Flu Like Symptoms     Pt reports high temp at home and dry throat when trying to talk. Pt has dry cough and \"haven't felt very good\" x 3 days       Reason for Admission  high temp.     Admission Date  5/13/2022    CODE STATUS  Full Code    HPI & HOSPITAL COURSE  This is a 67 y.o. male here with acute respiratory failure with hypoxia secondary to influenza A.  COVID-19 was noted to be negative.  He was started on supportive measures, oxygen supplementation, and Tamiflu.  Patient's symptoms improved, he was able to be weaned off the oxygen.  He reports a history of diabetes mellitus type 2 but is not on any medication, and his hemoglobin A1c was 6.3, this appears to be diet controlled.  Plan of care and discharge plan was discussed with the patient's spouse.    A qualified  was used to interpret  during this encounter.  ’s name/ID number was [id number]  and mode of interpretation was iPAD.       Therefore, he is discharged in good and stable condition to home with close outpatient follow-up.    The patient recovered much more quickly than anticipated on admission.    Discharge Date  5/15/2022    FOLLOW UP ITEMS POST DISCHARGE  None    DISCHARGE DIAGNOSES  Principal Problem:    Influenza A with respiratory manifestations POA: Yes  Active Problems:    Sepsis with acute hypoxic respiratory failure without septic shock (HCC) POA: Yes    Acute respiratory failure with hypoxia (HCC) POA: Yes    Type 2 diabetes mellitus without complication, without long-term current use of insulin (HCC) POA: Yes    Hypomagnesemia POA: Yes    Hypertension POA: Unknown    Class 1 obesity due to excess calories without serious comorbidity with body mass index (BMI) of 32.0 to 32.9 in adult POA: Yes  Resolved Problems:    * No resolved hospital problems. *      FOLLOW UP  No future appointments.  No follow-up provider " specified.    MEDICATIONS ON DISCHARGE     Medication List      START taking these medications      Instructions   oseltamivir 75 MG Caps  Commonly known as: TAMIFLU   Take 1 Capsule by mouth 2 times a day for 4 days.  Dose: 75 mg        CONTINUE taking these medications      Instructions   amLODIPine 5 MG Tabs  Commonly known as: NORVASC   Take 5 mg by mouth 2 times a day.  Dose: 5 mg            Allergies  No Known Allergies    DIET  Orders Placed This Encounter   Procedures   • Diet Order Diet: Regular     Standing Status:   Standing     Number of Occurrences:   1     Order Specific Question:   Diet:     Answer:   Regular [1]       ACTIVITY  As tolerated.  Weight bearing as tolerated    CONSULTATIONS  None    PROCEDURES  None    LABORATORY  Lab Results   Component Value Date    SODIUM 132 (L) 05/15/2022    POTASSIUM 4.6 05/15/2022    CHLORIDE 98 05/15/2022    CO2 23 05/15/2022    GLUCOSE 121 (H) 05/15/2022    BUN 15 05/15/2022    CREATININE 0.96 05/15/2022        Lab Results   Component Value Date    WBC 4.7 (L) 05/15/2022    HEMOGLOBIN 13.2 (L) 05/15/2022    HEMATOCRIT 39.3 (L) 05/15/2022    PLATELETCT 223 05/15/2022        Total time of the discharge process exceeds 35 minutes.

## 2022-05-15 NOTE — PROGRESS NOTES
4 Eyes Skin Assessment Completed by EDDIE Bautista and EDDIE Guerra.    Head WDL  Ears WDL  Nose WDL  Mouth WDL  Neck WDL  Breast/Chest WDL  Shoulder Blades WDL  Spine WDL  (R) Arm/Elbow/Hand WDL  (L) Arm/Elbow/Hand WDL  Abdomen WDL  Groin WDL  Scrotum/Coccyx/Buttocks WDL  (R) Leg WDL  (L) Leg WDL  (R) Heel/Foot/Toe WDL  (L) Heel/Foot/Toe WDL          Devices In Places SCD's      Interventions In Place Pressure Redistribution Mattress    Possible Skin Injury No    Pictures Uploaded Into Epic N/A  Wound Consult Placed N/A  RN Wound Prevention Protocol Ordered No

## 2022-05-15 NOTE — CARE PLAN
The patient is Stable - Low risk of patient condition declining or worsening    Shift Goals  Clinical Goals: wean off O2  Patient Goals: feel better    Progress made toward(s) clinical / shift goals:  Pt rested through the night and denied pain.    Patient is not progressing towards the following goals:N/A      Problem: Knowledge Deficit - Standard  Goal: Patient and family/care givers will demonstrate understanding of plan of care, disease process/condition, diagnostic tests and medications  Outcome: Progressing

## 2022-05-16 LAB
BACTERIA UR CULT: NORMAL
SIGNIFICANT IND 70042: NORMAL
SITE SITE: NORMAL
SOURCE SOURCE: NORMAL

## 2022-05-17 ENCOUNTER — PHARMACY VISIT (OUTPATIENT)
Dept: PHARMACY | Facility: MEDICAL CENTER | Age: 67
End: 2022-05-17
Payer: COMMERCIAL

## 2022-05-17 PROCEDURE — RXMED WILLOW AMBULATORY MEDICATION CHARGE: Performed by: FAMILY MEDICINE

## 2022-05-18 LAB
BACTERIA BLD CULT: NORMAL
SIGNIFICANT IND 70042: NORMAL
SITE SITE: NORMAL
SOURCE SOURCE: NORMAL

## 2022-05-19 LAB
BACTERIA BLD CULT: NORMAL
SIGNIFICANT IND 70042: NORMAL
SITE SITE: NORMAL
SOURCE SOURCE: NORMAL

## 2022-05-25 NOTE — DOCUMENTATION QUERY
"                                                                         [facility]                                                                       Query Response Note      PATIENT:               DIANE BELLAMY  ACCT #:                  [id number]  MRN:                     [id number]  :                      1955  ADMIT DATE:       2022 9:59 PM  DISCH DATE:        5/15/2022 2:28 PM  RESPONDING  PROVIDER #:        [id number]           QUERY TEXT:    The patient was admitted 2022 with a diagnosis of sepsis and influenza A. Initial SIRS were 2/4 with temperature 101.1 F and heart rate 127. Patient was treated with IVF bolus, IV antibiotics, tamiflu and O2. Vital signs improved on day 2 with temperature 98.2 F - 99.2, heart rate 83 - 96 and respirations 16 - 20. Per DC Summary dated 5/15 \"patient recovered much more quickly than anticipated\".    Please clarify the status of sepsis after study:    The patient's Clinical Indicators include:  2139 Admit SIRS 2/4 (T: 101.1 F; HR: 127)   -  SIRS 2/4 (HR: 110 - 115; RR: 25 - 35)  WBC's: 7.7; Lactic Acid: 1.9; Influenza A RNA: POS; BC's x 2 & UC: NEG   CXR: Low lung volumes w/ perihilar & basilar hypoventilatory changes  H&P: Sepsis; influenza A; acute resp failure w/ hypoxia; DM 2      T: 98.2 F - 99.2; HR: 83 - 96; RR: 16 - 20  HM PN: Feels better, cough w/ thick sputum. O2 2 L    5/15 D/S: Recovered much more quickly than anticipated    Treatment: IVF bolus; IV antibiotics; tamiflu; nebulizer; O2; lab/imaging  Risk Factors: Influenza A; resp failure w/ hypoxia; DM 2    Thank You,  Gay Adan RN  Clinical    Connect via [employer] Messenger  Options provided:   -- Patient with SIRS and influenza A, Sepsis ruled out   -- Patient with SIRS and influenza A, Sepsis confirmed   -- Other explanation, (please specify other explanation)   -- Unable to determine      Query created by: Gay Adan on 2022 " 12:27 PM    RESPONSE TEXT:    Patient with SIRS and influenza A, Sepsis ruled out          Electronically signed by:  IRA SCHMIDT MD 5/25/2022 9:57 AM

## 2024-01-17 NOTE — ED NOTES
Patient aware that transport will be here to pick him up soon. Patient agreeable. Denies any needs at this time.    no dysuria, no frequency, and no hematuria.